# Patient Record
Sex: MALE | Race: WHITE | NOT HISPANIC OR LATINO | Employment: FULL TIME | URBAN - METROPOLITAN AREA
[De-identification: names, ages, dates, MRNs, and addresses within clinical notes are randomized per-mention and may not be internally consistent; named-entity substitution may affect disease eponyms.]

---

## 2021-03-18 ENCOUNTER — AMBULATORY - HEALTHEAST (OUTPATIENT)
Dept: NURSING | Facility: CLINIC | Age: 56
End: 2021-03-18

## 2021-04-08 ENCOUNTER — AMBULATORY - HEALTHEAST (OUTPATIENT)
Dept: NURSING | Facility: CLINIC | Age: 56
End: 2021-04-08

## 2021-05-26 ENCOUNTER — RECORDS - HEALTHEAST (OUTPATIENT)
Dept: ADMINISTRATIVE | Facility: CLINIC | Age: 56
End: 2021-05-26

## 2021-05-28 ENCOUNTER — RECORDS - HEALTHEAST (OUTPATIENT)
Dept: ADMINISTRATIVE | Facility: CLINIC | Age: 56
End: 2021-05-28

## 2024-06-15 ENCOUNTER — HOSPITAL ENCOUNTER (EMERGENCY)
Facility: CLINIC | Age: 59
Discharge: HOME | End: 2024-06-15
Attending: PHYSICIAN ASSISTANT | Admitting: PHYSICIAN ASSISTANT
Payer: COMMERCIAL

## 2024-06-15 VITALS
HEART RATE: 72 BPM | WEIGHT: 193 LBS | OXYGEN SATURATION: 97 % | RESPIRATION RATE: 16 BRPM | DIASTOLIC BLOOD PRESSURE: 83 MMHG | TEMPERATURE: 98.2 F | SYSTOLIC BLOOD PRESSURE: 140 MMHG

## 2024-06-15 DIAGNOSIS — S51.812A LACERATION OF LEFT FOREARM, INITIAL ENCOUNTER: ICD-10-CM

## 2024-06-15 PROCEDURE — 12001 RPR S/N/AX/GEN/TRNK 2.5CM/<: CPT | Performed by: PHYSICIAN ASSISTANT

## 2024-06-15 PROCEDURE — G0463 HOSPITAL OUTPT CLINIC VISIT: HCPCS | Performed by: PHYSICIAN ASSISTANT

## 2024-06-15 RX ORDER — ALBUTEROL SULFATE 90 UG/1
2 AEROSOL, METERED RESPIRATORY (INHALATION) EVERY 4 HOURS PRN
COMMUNITY
Start: 2023-08-09

## 2024-06-15 ASSESSMENT — ACTIVITIES OF DAILY LIVING (ADL): ADLS_ACUITY_SCORE: 35

## 2024-06-15 NOTE — ED PROVIDER NOTES
History     Chief Complaint   Patient presents with    Laceration     Wrist lac, bleeding controlled on arrival, numbness to thumb, +radial pulse, last tetanus 2023     HPI  Catracho Main is a 58 year old male who presents to urgent care with concern over laceration to his distal left forearm/wrist which occurred just prior to arrival noticed cut on the metal edge of a dryer event.  He has moderate pain in the area 5/10 on the pain scale.  He does complain of decree sensation/paresthesias to the dorsal aspect of the proximal thumb.  No suspicion for foreign body embedded in the wound.  His tetanus vaccine is up to date.      Allergies:  Allergies   Allergen Reactions    Sulfa Antibiotics Diarrhea    Iodinated Contrast Media Hives and Rash     Problem List:    There are no problems to display for this patient.     Past Medical History:    No past medical history on file.    Past Surgical History:    No past surgical history on file.    Family History:    No family history on file.    Social History:  Marital Status:   [2]        Medications:    albuterol (PROAIR HFA/PROVENTIL HFA/VENTOLIN HFA) 108 (90 Base) MCG/ACT inhaler  beclomethasone HFA (QVAR REDIHALER) 80 MCG/ACT inhaler      Review of Systems  INTEGUMENTARY/SKIN: POSITIVE for laceration left forearm   RESP:NEGATIVE for significant cough or SOB  MUSCULOSKELETAL: POSITIVE  for moderate left forearm pain at site of laceration and NEGATIVE for other concerning new onset arthralgias or myalgias   NEURO: POSITIVE for numbness left thumb   Physical Exam   BP: (!) 140/83  Pulse: 72  Temp: 98.2  F (36.8  C)  Resp: 16  Weight: 87.5 kg (193 lb)  SpO2: 97 %  Physical Exam  Constitutional:       General: He is not in acute distress.     Appearance: He is not ill-appearing or toxic-appearing.   Cardiovascular:      Pulses:           Radial pulses are 2+ on the left side.   Musculoskeletal:      Left forearm: Laceration and tenderness present. No edema, deformity  or bony tenderness.      Left wrist: No deformity, bony tenderness, snuff box tenderness or crepitus. Normal range of motion.      Left hand: Normal strength. There is no disruption of two-point discrimination. Normal capillary refill. Normal pulse.   Skin:     General: Skin is warm and dry.      Capillary Refill: Capillary refill takes less than 2 seconds.      Findings: Laceration (2.5 cm subcutaneous laceration to distal left foearm) present. No ecchymosis, erythema or rash.   Neurological:      Mental Status: He is alert.      Comments: Sensation to light touch of left wrist and hand grossly intact       ED Course        [facility]    -Laceration Repair    Date/Time: 6/15/2024 2:45 PM    Performed by: Gill Rizo PA-C  Authorized by: Gill Rizo PA-C    Risks, benefits and alternatives discussed.      ANESTHESIA (see MAR for exact dosages):     Anesthesia method:  Local infiltration    Local anesthetic:  Bupivacaine 0.25% w/o epi  LACERATION DETAILS     Location: distal left forearm.    Length (cm):  2.5  EXPLORATION:     Hemostasis achieved with:  Direct pressure    Contaminated: no      TREATMENT:     Area cleansed with:  Hibiclens    Amount of cleaning:  Standard    SKIN REPAIR     Repair method:  Sutures    Suture size:  4-0    Suture material:  Nylon    Suture technique:  Simple interrupted    Number of sutures:  5    POST-PROCEDURE DETAILS     Dressing:  Antibiotic ointment and non-adherent dressing      PROCEDURE    Patient Tolerance:  Patient tolerated the procedure well with no immediate complications         Critical Care time:  none        No results found for this or any previous visit (from the past 24 hour(s)).  Medications - No data to display    Assessments & Plan (with Medical Decision Making)     I have reviewed the nursing notes.  I have reviewed the findings, diagnosis, plan and need for follow up with the patient.     New Prescriptions    No medications on  file     Final diagnoses:   Laceration of left forearm, initial encounter     50-year-old male presents to urgent care with concern of laceration to the distal left forearm/wrist which occurred from a dryer event just prior to arrival.  Physical exam findings significant for 2.5 cm subcutaneous laceration to the volar and radial aspect of the distal left forearm.  After discussing versus benefits patient agreed to proceed with primary closure of his wound.  He tolerated placement of five 4-0 sutures without any immediate complications.  He was discharged home stable with instructions for suture removal in 7 days. Suture care instructions, signs of infection, worrisome reasons to return to ER/UC sooner discussed.     Disclaimer: This note consists of symbols derived from keyboarding, dictation, and/or voice recognition software. As a result, there may be errors in the script that have gone undetected.  Please consider this when interpreting information found in the chart.    6/15/2024   [facility] EMERGENCY DEPT       Gill Rizo PA-C  06/15/24 [id number]